# Patient Record
Sex: FEMALE | Race: WHITE | NOT HISPANIC OR LATINO | URBAN - METROPOLITAN AREA
[De-identification: names, ages, dates, MRNs, and addresses within clinical notes are randomized per-mention and may not be internally consistent; named-entity substitution may affect disease eponyms.]

---

## 2021-05-14 ENCOUNTER — OFFICE (OUTPATIENT)
Dept: URBAN - METROPOLITAN AREA CLINIC 102 | Facility: CLINIC | Age: 56
End: 2021-05-14

## 2021-05-14 VITALS
SYSTOLIC BLOOD PRESSURE: 149 MMHG | WEIGHT: 169 LBS | HEIGHT: 66 IN | DIASTOLIC BLOOD PRESSURE: 77 MMHG | HEART RATE: 82 BPM | TEMPERATURE: 97.3 F

## 2021-05-14 DIAGNOSIS — R19.4 CHANGE IN BOWEL HABIT: ICD-10-CM

## 2021-05-14 DIAGNOSIS — K92.1 MELENA: ICD-10-CM

## 2021-05-14 DIAGNOSIS — D50.9 IRON DEFICIENCY ANEMIA, UNSPECIFIED: ICD-10-CM

## 2021-05-14 DIAGNOSIS — R10.84 GENERALIZED ABDOMINAL PAIN: ICD-10-CM

## 2021-05-14 DIAGNOSIS — K62.5 HEMORRHAGE OF ANUS AND RECTUM: ICD-10-CM

## 2021-05-14 PROCEDURE — 00031: CPT | Performed by: INTERNAL MEDICINE

## 2021-05-14 PROCEDURE — 99205 OFFICE O/P NEW HI 60 MIN: CPT

## 2021-05-14 RX ORDER — PANTOPRAZOLE SODIUM 40 MG/1
TABLET, DELAYED RELEASE ORAL
Qty: 0 | Refills: 0 | Status: ACTIVE

## 2021-05-14 RX ORDER — DICYCLOMINE HYDROCHLORIDE 20 MG/1
TABLET ORAL
Qty: 90 | Refills: 2 | Status: ACTIVE
Start: 2021-05-14

## 2021-05-14 NOTE — SERVICEHPINOTES
LIDIA ROMERO   is a   56   year old    female who is being seen in consultation at the request of   SHOAIB LOWE   for anemia. She started feeling fatigued a couple months ago. Had labs through PCP on 4/5 which showed a hgb of 6.6.BRRepeat labs on 4/9/21-- hgb 6.0, iron sat 6%, ferritin 9.BRNo labs since then. She has not had a transfusion. She tried taking iron pills x 1 week but this caused worsening abd pain so stopped over 1 month ago.BRShe has had rectal bleeding and dark stools over the past couple months. Both are intermittent and "random". Having lost stool, BMs 2-3 times per day. Solid at times. Her sister passed away suddenly 5 weeks ago, has been having worsening abd pain/diarrhea due to stress from this. Was given levsin to take prn. Will get stabbing pains at times, hypogastric.  Reports bad acid reflux recently as well. No vomiting. Did regurgitate food once. Tums help for a bit. Has tried taking PPIs but not daily.BRTakes aleve/ibuprofen most nights for joint pain. States she has done this for some time. Will take 2 Excedrin in the morning as well. BRNo prior EGD or colonoscopy. No known family hx of GI issues or colon cancer.

## 2021-06-03 ENCOUNTER — OFFICE (OUTPATIENT)
Dept: URBAN - METROPOLITAN AREA CLINIC 34 | Facility: CLINIC | Age: 56
End: 2021-06-03
Payer: COMMERCIAL

## 2021-06-03 DIAGNOSIS — Z11.59 ENCOUNTER FOR SCREENING FOR OTHER VIRAL DISEASES: ICD-10-CM

## 2021-06-03 PROCEDURE — G2023 SPECIMEN COLLECT COVID-19: HCPCS | Performed by: INTERNAL MEDICINE

## 2021-06-07 ENCOUNTER — OFFICE (OUTPATIENT)
Dept: URBAN - METROPOLITAN AREA PATHOLOGY 18 | Facility: PATHOLOGY | Age: 56
End: 2021-06-07
Payer: COMMERCIAL

## 2021-06-07 ENCOUNTER — OFFICE (OUTPATIENT)
Dept: URBAN - METROPOLITAN AREA CLINIC 98 | Facility: CLINIC | Age: 56
End: 2021-06-07

## 2021-06-07 ENCOUNTER — OFFICE (OUTPATIENT)
Dept: URBAN - METROPOLITAN AREA CLINIC 98 | Facility: CLINIC | Age: 56
End: 2021-06-07
Payer: COMMERCIAL

## 2021-06-07 VITALS
DIASTOLIC BLOOD PRESSURE: 72 MMHG | SYSTOLIC BLOOD PRESSURE: 133 MMHG | SYSTOLIC BLOOD PRESSURE: 145 MMHG | HEART RATE: 71 BPM | TEMPERATURE: 97.7 F | RESPIRATION RATE: 15 BRPM | HEART RATE: 82 BPM | RESPIRATION RATE: 16 BRPM | TEMPERATURE: 97.6 F | HEART RATE: 76 BPM | OXYGEN SATURATION: 96 % | SYSTOLIC BLOOD PRESSURE: 123 MMHG | HEIGHT: 66 IN | WEIGHT: 145 LBS | SYSTOLIC BLOOD PRESSURE: 133 MMHG | TEMPERATURE: 97.3 F | TEMPERATURE: 97.7 F | HEART RATE: 73 BPM | OXYGEN SATURATION: 100 % | DIASTOLIC BLOOD PRESSURE: 67 MMHG | SYSTOLIC BLOOD PRESSURE: 130 MMHG | RESPIRATION RATE: 19 BRPM | HEART RATE: 76 BPM | SYSTOLIC BLOOD PRESSURE: 141 MMHG | RESPIRATION RATE: 17 BRPM | SYSTOLIC BLOOD PRESSURE: 130 MMHG | SYSTOLIC BLOOD PRESSURE: 123 MMHG | OXYGEN SATURATION: 98 % | SYSTOLIC BLOOD PRESSURE: 104 MMHG | SYSTOLIC BLOOD PRESSURE: 135 MMHG | DIASTOLIC BLOOD PRESSURE: 83 MMHG | HEART RATE: 71 BPM | SYSTOLIC BLOOD PRESSURE: 129 MMHG | HEART RATE: 75 BPM | OXYGEN SATURATION: 96 % | HEART RATE: 75 BPM | SYSTOLIC BLOOD PRESSURE: 135 MMHG | RESPIRATION RATE: 15 BRPM | RESPIRATION RATE: 19 BRPM | DIASTOLIC BLOOD PRESSURE: 78 MMHG | HEART RATE: 93 BPM | SYSTOLIC BLOOD PRESSURE: 129 MMHG | DIASTOLIC BLOOD PRESSURE: 58 MMHG | OXYGEN SATURATION: 100 % | RESPIRATION RATE: 17 BRPM | DIASTOLIC BLOOD PRESSURE: 70 MMHG | DIASTOLIC BLOOD PRESSURE: 70 MMHG | DIASTOLIC BLOOD PRESSURE: 58 MMHG | HEART RATE: 93 BPM | HEART RATE: 78 BPM | DIASTOLIC BLOOD PRESSURE: 72 MMHG | TEMPERATURE: 97.3 F | HEART RATE: 82 BPM | OXYGEN SATURATION: 99 % | SYSTOLIC BLOOD PRESSURE: 141 MMHG | OXYGEN SATURATION: 98 % | OXYGEN SATURATION: 99 % | DIASTOLIC BLOOD PRESSURE: 83 MMHG | SYSTOLIC BLOOD PRESSURE: 104 MMHG | DIASTOLIC BLOOD PRESSURE: 67 MMHG | HEIGHT: 66 IN | DIASTOLIC BLOOD PRESSURE: 86 MMHG | SYSTOLIC BLOOD PRESSURE: 145 MMHG | HEART RATE: 78 BPM | DIASTOLIC BLOOD PRESSURE: 86 MMHG | DIASTOLIC BLOOD PRESSURE: 78 MMHG | TEMPERATURE: 97.6 F | HEART RATE: 73 BPM | RESPIRATION RATE: 16 BRPM | WEIGHT: 145 LBS

## 2021-06-07 DIAGNOSIS — D37.4 NEOPLASM OF UNCERTAIN BEHAVIOR OF COLON: ICD-10-CM

## 2021-06-07 DIAGNOSIS — K62.5 HEMORRHAGE OF ANUS AND RECTUM: ICD-10-CM

## 2021-06-07 DIAGNOSIS — R19.4 CHANGE IN BOWEL HABIT: ICD-10-CM

## 2021-06-07 DIAGNOSIS — D50.9 IRON DEFICIENCY ANEMIA, UNSPECIFIED: ICD-10-CM

## 2021-06-07 DIAGNOSIS — K56.601 COMPLETE INTESTINAL OBSTRUCTION, UNSPECIFIED AS TO CAUSE: ICD-10-CM

## 2021-06-07 PROBLEM — K56.600 PARTIAL INTESTINAL OBSTRUCTION, UNSPECIFIED AS TO CAUSE: Status: ACTIVE | Noted: 2021-06-07

## 2021-06-07 PROCEDURE — 00813 ANES UPR LWR GI NDSC PX: CPT | Mod: AA,QS

## 2021-06-07 PROCEDURE — 88305 TISSUE EXAM BY PATHOLOGIST: CPT | Performed by: PATHOLOGY

## 2021-07-09 ENCOUNTER — OFFICE (OUTPATIENT)
Dept: URBAN - METROPOLITAN AREA CLINIC 102 | Facility: CLINIC | Age: 56
End: 2021-07-09
Payer: COMMERCIAL

## 2021-07-09 DIAGNOSIS — D50.9 IRON DEFICIENCY ANEMIA, UNSPECIFIED: ICD-10-CM

## 2021-07-09 PROCEDURE — 96365 THER/PROPH/DIAG IV INF INIT: CPT | Performed by: INTERNAL MEDICINE

## 2021-07-16 ENCOUNTER — OFFICE (OUTPATIENT)
Dept: URBAN - METROPOLITAN AREA CLINIC 102 | Facility: CLINIC | Age: 56
End: 2021-07-16
Payer: COMMERCIAL

## 2021-07-16 DIAGNOSIS — D50.9 IRON DEFICIENCY ANEMIA, UNSPECIFIED: ICD-10-CM

## 2021-07-16 PROCEDURE — 96365 THER/PROPH/DIAG IV INF INIT: CPT | Performed by: INTERNAL MEDICINE
